# Patient Record
Sex: FEMALE | Race: WHITE | Employment: STUDENT | URBAN - METROPOLITAN AREA
[De-identification: names, ages, dates, MRNs, and addresses within clinical notes are randomized per-mention and may not be internally consistent; named-entity substitution may affect disease eponyms.]

---
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
## 2024-09-05 ENCOUNTER — OFFICE VISIT (OUTPATIENT)
Age: 24
End: 2024-09-05

## 2024-09-05 VITALS
WEIGHT: 150 LBS | OXYGEN SATURATION: 98 % | DIASTOLIC BLOOD PRESSURE: 83 MMHG | SYSTOLIC BLOOD PRESSURE: 128 MMHG | TEMPERATURE: 98 F | HEART RATE: 62 BPM

## 2024-09-05 DIAGNOSIS — R30.0 BURNING WITH URINATION: ICD-10-CM

## 2024-09-05 DIAGNOSIS — N39.0 ACUTE UTI: Primary | ICD-10-CM

## 2024-09-05 LAB
BILIRUBIN, URINE, POC: NEGATIVE
BLOOD URINE, POC: NEGATIVE
GLUCOSE URINE, POC: NEGATIVE
KETONES, URINE, POC: NEGATIVE
LEUKOCYTE ESTERASE, URINE, POC: ABNORMAL
NITRITE, URINE, POC: POSITIVE
PH, URINE, POC: 7.5 (ref 4.6–8)
PROTEIN,URINE, POC: NEGATIVE
SPECIFIC GRAVITY, URINE, POC: 1.02 (ref 1–1.03)
URINALYSIS CLARITY, POC: ABNORMAL
URINALYSIS COLOR, POC: ABNORMAL
UROBILINOGEN, POC: NORMAL

## 2024-09-05 RX ORDER — NITROFURANTOIN 25; 75 MG/1; MG/1
100 CAPSULE ORAL 2 TIMES DAILY
Qty: 14 CAPSULE | Refills: 0 | Status: SHIPPED | OUTPATIENT
Start: 2024-09-05 | End: 2024-09-12

## 2024-09-05 NOTE — PATIENT INSTRUCTIONS
Presentation and testing today is consistent with urinary tract infection  No fevers/chills or flank pain to suggest ascending infection or complications at this time  Will treat with Macrobid as directed  Urine culture sent for confirmation, will contact you in 2-3 days with results  Continue to drink lots of fluids  Follow up here or with PCP if symptoms persist  Go to the ED with any high fevers/chills, severe back/flank pain, or if vomiting and unable to tolerate fluids/medication

## 2024-09-05 NOTE — PROGRESS NOTES
Michelle Vicente (:  2000) is a 24 y.o. female,New patient, here for evaluation of the following chief complaint(s):  Dysuria (Pt present for UTI sx since Monday.Pt stated that sx are Burning while urinating, discomfort, tint of blood when wiped on Monday )      Assessment & Plan :  Visit Diagnoses and Associated Orders       Acute UTI    -  Primary    nitrofurantoin, macrocrystal-monohydrate, (MACROBID) 100 MG capsule [31140]           Burning with urination        AMB POC URINALYSIS DIP STICK MANUAL W/O MICRO [48755 CPT(R)]      Urine culture (clean catch) [03179 Custom]           ORDERS WITHOUT AN ASSOCIATED DIAGNOSIS    linaCLOtide (LINZESS) 72 MCG CAPS capsule [[id number]]            Presentation and testing today is consistent with urinary tract infection  No fevers/chills or flank pain to suggest ascending infection or complications at this time  Will treat with Macrobid as directed  Urine culture sent for confirmation, will contact you in 2-3 days with results  Continue to drink lots of fluids  Follow up here or with PCP if symptoms persist  Go to the ED with any high fevers/chills, severe back/flank pain, or if vomiting and unable to tolerate fluids/medication       Subjective :    Dysuria          24 y.o. female presents with symptoms of dysuria and increased urinary frequency for the past 3 days.  She also noted some tinges of blood when wiping several days ago.  She denies any fevers, chills or bodyaches.  She does report some general feelings of fatigue and malaise.  Denies any significant flank pain or tenderness, she does report some very mild low back pain.  Denies abdominal pain, nausea or vomiting.  Denies vaginal discharge or odor.  Just recently got off of her menstrual cycle and denies chance of pregnancy.  Denies concern for STIs.  No allergies to antibiotics, no kidney or liver problems.         Vitals:    24 1703   BP: 128/83   Site: Left Upper Arm   Position: Sitting   Cuff Size: Medium

## 2024-09-07 LAB — BACTERIA UR CULT: NORMAL
